# Patient Record
Sex: FEMALE | Race: BLACK OR AFRICAN AMERICAN | Employment: UNEMPLOYED | URBAN - METROPOLITAN AREA
[De-identification: names, ages, dates, MRNs, and addresses within clinical notes are randomized per-mention and may not be internally consistent; named-entity substitution may affect disease eponyms.]

---

## 2017-01-16 ENCOUNTER — APPOINTMENT (OUTPATIENT)
Dept: GENERAL RADIOLOGY | Age: 57
End: 2017-01-16
Attending: PHYSICIAN ASSISTANT
Payer: MEDICAID

## 2017-01-16 ENCOUNTER — HOSPITAL ENCOUNTER (EMERGENCY)
Age: 57
Discharge: HOME | End: 2017-01-16
Attending: EMERGENCY MEDICINE
Payer: MEDICAID

## 2017-01-16 VITALS
TEMPERATURE: 97.9 F | WEIGHT: 130 LBS | OXYGEN SATURATION: 100 % | BODY MASS INDEX: 23.92 KG/M2 | SYSTOLIC BLOOD PRESSURE: 131 MMHG | DIASTOLIC BLOOD PRESSURE: 92 MMHG | HEIGHT: 62 IN | RESPIRATION RATE: 15 BRPM | HEART RATE: 88 BPM

## 2017-01-16 DIAGNOSIS — S83.91XA SPRAIN OF RIGHT KNEE, UNSPECIFIED LIGAMENT, INITIAL ENCOUNTER: Primary | ICD-10-CM

## 2017-01-16 PROCEDURE — 74011250637 HC RX REV CODE- 250/637: Performed by: PHYSICIAN ASSISTANT

## 2017-01-16 PROCEDURE — 73562 X-RAY EXAM OF KNEE 3: CPT

## 2017-01-16 PROCEDURE — 99284 EMERGENCY DEPT VISIT MOD MDM: CPT

## 2017-01-16 RX ORDER — IBUPROFEN 400 MG/1
800 TABLET ORAL
Status: COMPLETED | OUTPATIENT
Start: 2017-01-16 | End: 2017-01-16

## 2017-01-16 RX ORDER — IBUPROFEN 800 MG/1
800 TABLET ORAL
Qty: 20 TAB | Refills: 0 | Status: SHIPPED | OUTPATIENT
Start: 2017-01-16 | End: 2018-03-15 | Stop reason: ALTCHOICE

## 2017-01-16 RX ADMIN — IBUPROFEN 800 MG: 400 TABLET, FILM COATED ORAL at 21:21

## 2017-01-17 NOTE — ED NOTES
Patient reports to ED c/o right knee pain. Patient states \"I twisted it and then fell last night. \" Reports pain behind knee. Patient reports drinking 2 beers today. Patient in NAD.

## 2017-01-17 NOTE — ED NOTES
Discharge Instructions Reviewed with patient per Goyo ENNIS. Discharge instructions given to patient per PA. Sarah able to return verbalize discharge instructions. Paper copy of discharge instructions given. No RX given to patient but instructed on 1 rx sent to patient selected pharmacy electronically per PA. Patient condition stable, Respiratory status WNL, Neurostatus intact.  Ambulatory with crutches out of er, to home with self

## 2017-01-17 NOTE — ED PROVIDER NOTES
Patient is a 61 y.o. female presenting with knee pain. The history is provided by the patient. Knee Pain    This is a new (Pt reports twisting her R knee and falling yesterday when she was startled around 8PM. Denies hx of trauma, arthritis or gout. Denies fever, chills, n/v, numbness tingling. Endorses R knee swelling. ) problem. The current episode started yesterday. The problem occurs constantly. The problem has been gradually worsening. The pain is present in the right knee. The quality of the pain is described as aching. The pain is at a severity of 10/10. Associated symptoms include limited range of motion (Pt states it will not bend all the way. ). Pertinent negatives include no numbness, no stiffness, no tingling, no itching, no back pain and no neck pain. The symptoms are aggravated by contact, movement and palpation. She has tried cold for the symptoms. The treatment provided mild relief. There has been a history of trauma. History reviewed. No pertinent past medical history. History reviewed. No pertinent past surgical history. History reviewed. No pertinent family history. Social History     Social History    Marital status: SINGLE     Spouse name: N/A    Number of children: N/A    Years of education: N/A     Occupational History    Not on file. Social History Main Topics    Smoking status: Current Every Day Smoker     Packs/day: 0.25    Smokeless tobacco: Never Used    Alcohol use 3.0 oz/week     6 Glasses of wine per week    Drug use: No    Sexual activity: Not Currently     Other Topics Concern    Not on file     Social History Narrative         ALLERGIES: Review of patient's allergies indicates no known allergies. Review of Systems   Constitutional: Negative for activity change, appetite change, chills, diaphoresis, fatigue and fever. HENT: Negative. Eyes: Negative. Respiratory: Negative. Negative for cough and shortness of breath.     Cardiovascular: Negative. Negative for chest pain and leg swelling. Gastrointestinal: Negative. Negative for abdominal pain, diarrhea, nausea and vomiting. Genitourinary: Negative. Musculoskeletal: Positive for arthralgias (R knee), gait problem and joint swelling. Negative for back pain, myalgias, neck pain, neck stiffness and stiffness. Skin: Negative. Negative for color change, itching, pallor and wound. Neurological: Negative for tingling and numbness. Psychiatric/Behavioral: Negative. Vitals:    01/16/17 2036   BP: (!) 131/92   Pulse: 88   Resp: 15   Temp: 97.9 °F (36.6 °C)   SpO2: 100%   Weight: 59 kg (130 lb)   Height: 5' 2\" (1.575 m)            Physical Exam   Constitutional: She is oriented to person, place, and time. She appears well-developed and well-nourished. No distress. HENT:   Head: Normocephalic and atraumatic. Right Ear: Hearing and external ear normal.   Left Ear: Hearing and external ear normal.   Nose: Nose normal.   Eyes: Conjunctivae and EOM are normal. Pupils are equal, round, and reactive to light. Neck: Normal range of motion. Pulmonary/Chest: Effort normal. No respiratory distress. Musculoskeletal:        Right hip: Normal.        Right knee: She exhibits decreased range of motion (Decreased flexion at regulo knee by 30deg d/t pain.), swelling and bony tenderness. She exhibits no effusion, no ecchymosis, no deformity, no laceration, no erythema, normal alignment, no LCL laxity, normal patellar mobility, normal meniscus and no MCL laxity. Tenderness found. Medial joint line, lateral joint line, MCL, LCL and patellar tendon tenderness noted. Left knee: Normal.        Right ankle: Normal.   Generalized R knee TTP. Lachman's and Jadon exams limited by pt's pain. Neg valgus/varus stress test.   Neurological: She is alert and oriented to person, place, and time. No cranial nerve deficit. Skin: Skin is warm and dry. She is not diaphoretic.    Psychiatric: She has a normal mood and affect. Her behavior is normal. Judgment and thought content normal.   Nursing note and vitals reviewed. MDM  Number of Diagnoses or Management Options  Sprain of right knee, unspecified ligament, initial encounter:   Diagnosis management comments: DDx: fx, dislocation, strain, sprain, contusion    LABORATORY TESTS:  No results found for this or any previous visit (from the past 12 hour(s)). IMAGING RESULTS:  XR KNEE RT 3 V   Final Result   FINDINGS: Three views of the right knee demonstrate no fracture or other acute  osseous or articular abnormality. There is no effusion. Mild patellofemoral  and medial compartment osteoarthritis. No erosion or chondrocalcinosis. Bone  mineralization is within normal limits.     IMPRESSION  IMPRESSION:   1. No fracture. 2. Mild osteoarthritis. MEDICATIONS GIVEN:  Medications  ibuprofen (MOTRIN) tablet 800 mg (800 mg Oral Given 1/16/17 2121)    IMPRESSION:  Sprain of right knee, unspecified ligament, initial encounter  (primary encounter diagnosis)    PLAN:  1. Current Discharge Medication List    START taking these medications    ibuprofen (MOTRIN) 800 mg tablet  Take 1 Tab by mouth every six (6) hours as needed for Pain. Qty: 20 Tab Refills: 0      CONTINUE these medications which have NOT CHANGED    guaiFENesin-codeine (ROBITUSSIN AC)  mg/5 mL solution  Take 5 mL by mouth three (3) times daily as needed for Cough. Max Daily Amount: 15 mL. Qty: 118 mL Refills: 0    albuterol (PROVENTIL HFA, VENTOLIN HFA, PROAIR HFA) 90 mcg/actuation inhaler   Take 1-2 Puffs by inhalation every four (4) hours as needed for Wheezing. Qty: 1 Inhaler Refills: 1    levofloxacin (LEVAQUIN) 500 mg tablet  Take 1 Tab by mouth daily. Qty: 7 Tab Refills: 0    methylcellulose, laxative, (CITRUCEL SUGAR FREE) Powd  Take 5 mL by mouth two (2) times a day. Qty: 1 Can Refills: 0        2.  Follow-up Information     Follow up With Details Comments [address] Negro Onofre MD In 1 week As needed, If symptoms worsen [address]  [place] [address]  [phone]      [facility] Schedule an appointment as soon as possible for a visit in 1   week As needed, If symptoms worsen Daniela Dawson [facility]    [address] Schedule an appointment as soon as possible for a   visit in 1 week As needed, If symptoms worsen [address] [facility] 1788 [phone]      Return to ED if worse                  Amount and/or Complexity of Data Reviewed  Tests in the radiology section of CPT®: ordered and reviewed  Tests in the medicine section of CPT®: ordered and reviewed    Patient Progress  Patient progress: stable    ED Course       Procedures      9:27 PM  I have discussed with patient their diagnosis, treatment, and follow up plan. The patient agrees to follow up as outlined in discharge paperwork and also to return to the ED with any worsening.  Sigrid Hillman PA-C

## 2017-01-17 NOTE — DISCHARGE INSTRUCTIONS
Knee Sprain: Care Instructions  Your Care Instructions    A knee sprain is one or more stretched, partly torn, or completely torn knee ligaments. Ligaments are bands of ropelike tissue that connect bone to bone and make the knee stable. The knee has four main ligaments. Knee sprains often happen because of a twisting or bending injury from sports such as skiing, basketball, soccer, or football. The knee turns one way while the lower or upper leg goes another way. A sprain also can happen when the knee is hit from the side or the front. If a knee ligament is slightly stretched, you will probably need only home treatment. You may need a splint or brace (immobilizer) for a partly torn ligament. A complete tear may need surgery. A minor knee sprain may take up to 6 weeks to heal, while a severe sprain may take months. Follow-up care is a key part of your treatment and safety. Be sure to make and go to all appointments, and call your doctor if you are having problems. It's also a good idea to know your test results and keep a list of the medicines you take. How can you care for yourself at home? · Follow instructions about how much weight you can put on your leg and how to walk with crutches. · Prop up your leg on a pillow when you ice it or anytime you sit or lie down for the next 3 days. Try to keep it above the level of your heart. This will help reduce swelling. · Put ice or a cold pack on your knee for 10 to 20 minutes at a time. Try to do this every 1 to 2 hours for the next 3 days (when you are awake) or until the swelling goes down. Put a thin cloth between the ice and your skin. Do not get the splint wet. · If you have an elastic bandage, make sure it is snug but not so tight that your leg is numb, tingles, or swells below the bandage. You can loosen the bandage if it is too tight. · Your doctor may recommend a brace (immobilizer) to support your knee while it heals. Wear it as directed.   · Ask your doctor if you can take an over-the-counter pain medicine, such as acetaminophen (Tylenol), ibuprofen (Advil, Motrin), or naproxen (Aleve). Be safe with medicines. Read and follow all instructions on the label. When should you call for help? Call 911 anytime you think you may need emergency care. For example, call if:  · You have sudden chest pain and shortness of breath, or you cough up blood. Call your doctor now or seek immediate medical care if:  · You have increased or severe pain. · You cannot move your toes or ankle. · Your foot is cool or pale or changes color. · You have tingling, weakness, or numbness in your foot or leg. · Your splint or brace feels too tight. · You are unable to straighten the knee, or the knee \"locks. \"  · You have signs of a blood clot in your leg, such as:  ¨ Pain in your calf, back of the knee, thigh, or groin. ¨ Redness and swelling in your leg. Watch closely for changes in your health, and be sure to contact your doctor if:  · Your pain is not getting better or is getting worse. Where can you learn more? Go to http://krishan-jesse.info/. Enter N406 in the search box to learn more about \"Knee Sprain: Care Instructions. \"  Current as of: May 23, 2016  Content Version: 11.1  © 1699-2797 [employer]. Care instructions adapted under license by [employer] (which disclaims liability or warranty for this information). If you have questions about a medical condition or this instruction, always ask your healthcare professional. [place] [address] any warranty or liability for your use of this information.

## 2018-03-15 ENCOUNTER — OFFICE VISIT (OUTPATIENT)
Dept: BEHAVIORAL/MENTAL HEALTH CLINIC | Age: 58
End: 2018-03-15

## 2018-03-15 VITALS
BODY MASS INDEX: 25.21 KG/M2 | HEART RATE: 93 BPM | WEIGHT: 137 LBS | DIASTOLIC BLOOD PRESSURE: 82 MMHG | HEIGHT: 62 IN | SYSTOLIC BLOOD PRESSURE: 139 MMHG

## 2018-03-15 DIAGNOSIS — F12.10 CANNABIS ABUSE, CONTINUOUS USE: ICD-10-CM

## 2018-03-15 DIAGNOSIS — F19.90 SUBSTANCE USE DISORDER: ICD-10-CM

## 2018-03-15 DIAGNOSIS — F10.982 ALCOHOL-INDUCED INSOMNIA (HCC): Primary | ICD-10-CM

## 2018-03-15 RX ORDER — TRAZODONE HYDROCHLORIDE 50 MG/1
50 TABLET ORAL
Qty: 30 TAB | Refills: 0 | Status: SHIPPED | OUTPATIENT
Start: 2018-03-15

## 2018-03-15 NOTE — MR AVS SNAPSHOT
[address] 1400 [address] [place] [address] 
[phone] Patient: Maggie Akbar MRN: [id number] AZF:0/8/1881 Visit Information Date & Time Provider Department Dept. Phone Encounter #  
 3/15/2018  2:00 PM Manjose m Husbands, 81 [facility] [phone] [id number] Upcoming Health Maintenance Date Due Hepatitis C Screening 1960 Pneumococcal 19-64 Medium Risk (1 of 1 - PPSV23) 7/6/1978 DTaP/Tdap/Td series (1 - Tdap) 7/6/1980 PAP AKA CERVICAL CYTOLOGY 7/6/1980 BREAST CANCER SCRN MAMMOGRAM 7/6/2009 FOBT Q 1 YEAR AGE 50-75 7/6/2009 Influenza Age 5 to Adult 8/1/2017 Allergies as of 3/15/2018  Review Complete On: 3/15/2018 By: Tomeka Husbands, NP No Known Allergies Current Immunizations  Never Reviewed No immunizations on file. Not reviewed this visit You Were Diagnosed With   
  
 Codes Comments Alcohol-induced insomnia (HCC)    -  Primary ICD-10-CM: R36.581 ICD-9-CM: 291.82 Substance use disorder     ICD-10-CM: F19.90 ICD-9-CM: 305.90 Cannabis abuse, continuous use     ICD-10-CM: F12.10 ICD-9-CM: 305.21 Vitals BP Pulse Height(growth percentile) Weight(growth percentile) BMI OB Status 139/82 93 5' 2\" (1.575 m) 137 lb (62.1 kg) 25.06 kg/m2 Postmenopausal  
 Smoking Status Current Every Day Smoker BMI and BSA Data Body Mass Index Body Surface Area 25.06 kg/m 2 1.65 m 2 Preferred Pharmacy Pharmacy Name Phone [address], [address] [phone] Your Updated Medication List  
  
   
This list is accurate as of 3/15/18  2:35 PM.  Always use your most recent med list.  
  
  
  
  
 traZODone 50 mg tablet Commonly known as:  Alexi Bowl Take 1 Tab by mouth nightly as needed for Sleep. Prescriptions Sent to Pharmacy Refills traZODone (DESYREL) 50 mg tablet 0 Sig: Take 1 Tab by mouth nightly as needed for Sleep. Class: Normal  
 Pharmacy: [facility] [address], [address] [facility] Toña Vanegas Ph #: [phone] Route: Oral  
  
We Performed the Following 5-DRUG SCREEN, UR [id number] Custom] CBC WITH AUTOMATED DIFF [09270 CPT(R)] HEPATIC FUNCTION PANEL [66581 CPT(R)] METABOLIC PANEL, COMPREHENSIVE [96748 CPT(R)] TSH 3RD GENERATION [76301 CPT(R)] Patient Instructions Sleep Tips What to avoid   
· Do not have drinks with caffeine, such as coffee or black tea, for 8 hours before bed. · Do not smoke or use other types of tobacco near bedtime. Nicotine is a stimulant and can keep you awake. · Avoid drinking alcohol late in the evening, because it can cause you to wake in the middle of the night. · Do not eat a big meal close to bedtime. If you are hungry, eat a light snack. · Do not drink a lot of water close to bedtime, because the need to urinate may wake you up during the night. · Do not read or watch TV in bed. Use the bed only for sleeping and sexual activity. What to try   
· Go to bed at the same time every night, and wake up at the same time every morning. Do not take naps during the day. · Keep your bedroom quiet, dark, and cool. · Get regular exercise, but not within 3 to 4 hours of your bedtime. · Sleep on a comfortable pillow and mattress. · If watching the clock makes you anxious, turn it facing away from you so you cannot see the time. · If you worry when you lie down, start a worry book. Well before bedtime, write down your worries, and then set the book and your concerns aside. · Try meditation or other relaxation techniques before you go to bed. · If you cannot fall asleep, get up and go to another room until you feel sleepy. Do something relaxing. Repeat your bedtime routine before you go to bed again. Make your house quiet and calm about an hour before bedtime. Turn down the lights, turn off the TV, log off the computer, and turn down the volume on music. This can help you relax after a busy day. Substance Abuse Resources Alcoholics Anonymous Hotline (Michelle )                      
 #054-1723 Narcotics Anonymous Hotline () 
 #0-599-701-663-077-3605 [facility] Jacki Eisenberg [address] [facility] #[phone] Http://www. Dympol.[employer]/index.htm [address] [facility]) 
 [phone]      [address] [facility] 
 [phone] [facility], [address] C/Kee Barton [facility] [place] at [facility] Admissions: [phone] [facility] for Recovery [phone] 
 
[email or web address] [address] [phone] The daily planet- [place] [address] street- [phone] Local CSB's 
 [address] [facility],[address] [facility] end - [address] [facility] end- [facility] Methadone out patient clinic [address] [facility] Mon, wed, Thursday- 8am - 12 noon. Can walk in any day and speak with counsellor, bring picture  [facility] Arvind [place] [address]- Orthodoxy dora based recovery model · Introducing [facility]! [facility] introduces [employer] patient portal. Now you can access parts of your medical record, email your doctor's office, and request medication refills online. 1. In your internet browser, go to [email or web address] 2. Click on the First Time User? Click Here link in the Sign In box. You will see the New Member Sign Up page. 3. Enter your [employer] Access Code exactly as it appears below. You will not need to use this code after youve completed the sign-up process. If you do not sign up before the expiration date, you must request a new code.  
 
· [employer] Access Code: [email or web address] 
 Expires: 6/13/2018  2:23 PM 
 
4. Enter the last four digits of your Social Security Number (xxxx) and Date of Birth (mm/dd/yyyy) as indicated and click Submit. You will be taken to the next sign-up page. 5. Create a [employer] ID. This will be your [employer] login ID and cannot be changed, so think of one that is secure and easy to remember. 6. Create a [employer] password. You can change your password at any time. 7. Enter your Password Reset Question and Answer. This can be used at a later time if you forget your password. 8. Enter your e-mail address. You will receive e-mail notification when new information is available in [address] 9. Click Sign Up. You can now view and download portions of your medical record. 10. Click the Download Summary menu link to download a portable copy of your medical information. If you have questions, please visit the Frequently Asked Questions section of the [employer] website. Remember, [employer] is NOT to be used for urgent needs. For medical emergencies, dial 911. Now available from your iPhone and Android! Please provide this summary of care documentation to your next provider. Your primary care clinician is listed as Karla Molina Md. If you have any questions after today's visit, please call [phone].

## 2018-03-15 NOTE — PATIENT INSTRUCTIONS
Sleep Tips    What to avoid    · Do not have drinks with caffeine, such as coffee or black tea, for 8 hours before bed. · Do not smoke or use other types of tobacco near bedtime. Nicotine is a stimulant and can keep you awake. · Avoid drinking alcohol late in the evening, because it can cause you to wake in the middle of the night. · Do not eat a big meal close to bedtime. If you are hungry, eat a light snack. · Do not drink a lot of water close to bedtime, because the need to urinate may wake you up during the night. · Do not read or watch TV in bed. Use the bed only for sleeping and sexual activity. What to try    · Go to bed at the same time every night, and wake up at the same time every morning. Do not take naps during the day. · Keep your bedroom quiet, dark, and cool. · Get regular exercise, but not within 3 to 4 hours of your bedtime. · Sleep on a comfortable pillow and mattress. · If watching the clock makes you anxious, turn it facing away from you so you cannot see the time. · If you worry when you lie down, start a worry book. Well before bedtime, write down your worries, and then set the book and your concerns aside. · Try meditation or other relaxation techniques before you go to bed. · If you cannot fall asleep, get up and go to another room until you feel sleepy. Do something relaxing. Repeat your bedtime routine before you go to bed again. Make your house quiet and calm about an hour before bedtime. Turn down the lights, turn off the TV, log off the computer, and turn down the volume on music. This can help you relax after a busy day. Substance Abuse Resources    Alcoholics Anonymous Hotline (II)                        #549-9987    Narcotics Anonymous Hotline (NA)   #8-997-612-651-897-0447    [facility]   [address]   #[phone]   Http://www. Maxta/index.htm    5 [facility])   [phone]      [address]    [address] [facility] Program   [phone]       [address] RODOLFO/Kee Franks Final    The [place] at [facility] [facility]   Admissions: [phone]    [facility] for [facility]   [phone]    [email or web address]    [facility] Reza- [address] [facility] [address] [facility] [phone]    The daily planet- [place] [facility] [address] [place] [address] end - [id number] [place] [address] end- [address]     [employer] resource [facility]. Methadone out patient clinic  [address] [facility] Mon, wed, Thursday- 8am - 12 noon.  Can walk in any day and speak with counsellor, bring picture ID    [facility]   Leticia Hankins [address] [place] [address]- Zoroastrianism dora based recovery model    ·

## 2018-03-15 NOTE — PROGRESS NOTES
Ambulatory Initial Psychiatric Evaluation     Chief Complaint: \" I have anxietya nd depression\" I am an addict\"    History of Present Illness: Agustin Ceron is a 61 y.o. female who presents with symptoms of anxiety and substance use anxiety disorder, alcohol use disorder, cannabis use continuous, h/o cocaine use. Patient reports/evidences the following emotional symptoms:  sleeping for 5-6   hrs and reported difficulty initiating and maintaining sleep. reported works hard and not able to sleep. Reported smokes cannabis for sleep weekly. reported has irritability, appetite is good,  has interest and energy, labile mood, able to focus and concentrate. Working night shifts. Reported taking vitamins daily. Reported her sleep is th main concern and will be much better if sleeping well. Denied ay symptoms of dori or psychosis. reported AH when under influence of cocaine only. Additional symptomatology include anxiety. The above symptoms have been present for a many years. The patient reports onset of symptoms for 10 years. These symptoms are of high severity as per patient's report. The symptoms are variable in nature. The patient's condition has been precipitated by and condition worsened with substance use. Stressors/life events: cocaine use, alcohol and THC use more than 20 years. Raped at age [id number] [address]. Pt denied any flashbacks, hypervigilance or avoidance or reexperience or night ambrocio. Pt denied any h/o seizures or head trauma or neurological problems. Client denied any SI or any plan or intent; denied HI or SIB. There is no evidence of hallucinations, psychosis or dori.      Past Psychiatric History:     Previous psychiatric care: admits    Age 27 to 39- half-way - for posession of cocaine-was on doxepine  Age 39- now- not on any meds- used alcohol, tylenol pm , relapsed on cocaine        Previous suicide attempts: denied    Previous self injurious behavior: No    Previous Psych Hospitalizations: denies    Current psychotropics: none          Previous psychiatric medications/ECT/TMS: denies            Past history of SA,rehab, detox, withdrawal: x 3- [facility]    Social History:   Social History     Social History    Marital status: LEGALLY      Spouse name: N/A    Number of children: N/A    Years of education: N/A     Social History Main Topics    Smoking status: Current Every Day Smoker     Packs/day: 0.25    Smokeless tobacco: Never Used    Alcohol use 3.6 oz/week     6 Cans of beer per week      Comment: 5-6 per day    Drug use: No    Sexual activity: Not Currently     Other Topics Concern    None     Social History Narrative        Ethnic:   Relationship Status:   Kids: 3- ages 13, 29, 21  Living Situation: Alone   Born:   Raised by:   Siblings:   Education:   Employment: part time sitter and house keeping  Tobacco:  tobacco use: smoked 3 cigarettes per day(s) for 20 years  Caffeine: no caffeine use  Alcohol: alcohol intake:> 5 beers per day(s), using alcohol since age 23  Illicit Drug Social History:  daily smoking cocaine, snorted in past, stopped cocaine 2 years ago; cannabis use weekly  Hobbies:  music   Abuse: denied  Sexual:  heterosexual  Support: family  Legal: longterm- 2 years - possession of cocaine and prostitution, trespassing charges, denied any pending charges    Family History:   Family History   Problem Relation Age of Onset    Hypertension Mother     Diabetes Mother     Diabetes Father         Family Psychiatric history: Theres no formal diagnosed psychiatric illness in the family. There is no history of suicide attempt in the family. Past Medical/Surgical History:   Past Medical History:   Diagnosis Date    Depression          Allergies:   No Known Allergies     Medication List:        A comprehensive review of systems was negative except for that written in the HPI.     Psychiatric/Mental Status Examination:     MENTAL STATUS EXAM:  Sensorium  oriented to time, place and person   Orientation person, place, time/date, situation, day of week, month of year and year   Relations cooperative   Eye Contact appropriate   Appearance:  casually dressed , older than stated age, and within normal Limits   Motor Behavior:  gait stable and within normal limits   Speech:  normal pitch and normal volume   Vocabulary average   Thought Process: goal directed and within normal limits   Thought Content free of delusions and free of hallucinations   Suicidal ideations no plan , no intention and none   Homicidal ideations no plan , no intention and none   Mood:  anxious and depressed   Affect:  anxious, depressed and mood-congruent   Memory recent  adequate   Memory remote:  adequate   Concentration:  adequate   Abstraction:  abstract   Insight:  fair   Reliability fair   Judgment:  fair     Labs:  Results for orders placed or performed during the hospital encounter of 09/25/15   EKG, 12 LEAD, INITIAL   Result Value Ref Range    Ventricular Rate 113 BPM    Atrial Rate 113 BPM    P-R Interval 162 ms    QRS Duration 50 ms    Q-T Interval 338 ms    QTC Calculation (Bezet) 463 ms    Calculated P Axis 64 degrees    Calculated R Axis 46 degrees    Calculated T Axis 65 degrees    Diagnosis       ** Poor data quality, interpretation may be adversely affected  Sinus tachycardia  Possible Left atrial enlargement  RSR' or QR pattern in V1 suggests right ventricular conduction delay  No previous ECGs available  Confirmed by Fallon Murray ([id number]) on 9/27/2015 2:02:15 PM           Assessment:  The client, Claire Marino is a 61 y.o. AA female presents with substance induced anxiety and mood disorder. She is currently using cannabis and alcohol. H/o cocaine use and reported sober since 2 years. She stated  \"I am addicted to cocaine, alcohol and cannabis\". Reported has insomnia, and anxiety denied any symptoms of depression or dori or psychosis.  Advised to begin substance use treatment program. Client stated that she is satisfied with the care received. Discussed importance of SUTP in her treatment plan and gave resources. Reviewed labs and records. Patient denies SI/HI/SIB. No evidence of AH/VH or delusions. Possible organic causes contributing are: none  Reviewed medical admissions and discussed with the patient. Client is medically stable. Vitals stable    PHQ 9 score: 11  HAM-2  Diagnosis: Substance use disorder, Alcohol induced insomnia, alcohol use continuous, cannabis use continuous, h/o cocaine use , r/o Substance use mood disrder    Treatment Plan:   1. Medication: begin trazodone 50 mg HS prn                         Ordered labs- CBC, LFT, BMP, UDS                        Gave substance use resources    2. Discussed:  the risks and benefits of the proposed medication  the potential medication side effects  dry mouth, GI disturbance, headache, insomnia, libido decreased, weight gain, somnolence  patient given opportunity to ask questions  3. Psychotherapy: Recommended: CBT- gave a list of local providers. 4. Medical: PCP  5. Return to Clinic: Follow-up Disposition: Not on File or sooner prn    The risk versus benefits of treatment were discussed and side effects explained. Patient agreed with plan. Patient instructed to call with any side effects.   - Instructed patient to call the clinic, and if after hours call the provider on call if experiences any suicidal thought or ideas to hurt herself or other. Also instructed to call 911 or go to the ED. Patient verbalized understanding and agreed to call. Patient was given an after visit summary or informed of [employer] [facility] which includes patient instructions, diagnoses, current medications, & vitals.       Time spent with Patient:  30 to 74 minutes    Prisca Pena NP  3/15/2018

## 2018-03-16 LAB
ALBUMIN SERPL-MCNC: 4.3 G/DL (ref 3.5–5.5)
ALBUMIN/GLOB SERPL: 1.4 {RATIO} (ref 1.2–2.2)
ALP SERPL-CCNC: 46 IU/L (ref 39–117)
ALT SERPL-CCNC: 17 IU/L (ref 0–32)
AMPHETAMINES UR QL SCN: NEGATIVE NG/ML
AST SERPL-CCNC: 23 IU/L (ref 0–40)
BASOPHILS # BLD AUTO: 0 X10E3/UL (ref 0–0.2)
BASOPHILS NFR BLD AUTO: 0 %
BILIRUB DIRECT SERPL-MCNC: 0.05 MG/DL (ref 0–0.4)
BILIRUB SERPL-MCNC: <0.2 MG/DL (ref 0–1.2)
BUN SERPL-MCNC: 9 MG/DL (ref 6–24)
BUN/CREAT SERPL: 18 (ref 9–23)
BZE UR QL: POSITIVE NG/ML
CALCIUM SERPL-MCNC: 9.3 MG/DL (ref 8.7–10.2)
CANNABINOIDS UR QL SCN: POSITIVE NG/ML
CHLORIDE SERPL-SCNC: 101 MMOL/L (ref 96–106)
CO2 SERPL-SCNC: 26 MMOL/L (ref 18–29)
CREAT SERPL-MCNC: 0.49 MG/DL (ref 0.57–1)
DRUG SCREEN COMMENT:, 753798: NORMAL
EOSINOPHIL # BLD AUTO: 0.3 X10E3/UL (ref 0–0.4)
EOSINOPHIL NFR BLD AUTO: 6 %
ERYTHROCYTE [DISTWIDTH] IN BLOOD BY AUTOMATED COUNT: 14.7 % (ref 12.3–15.4)
GFR SERPLBLD CREATININE-BSD FMLA CKD-EPI: 108 ML/MIN/1.73
GFR SERPLBLD CREATININE-BSD FMLA CKD-EPI: 124 ML/MIN/1.73
GLOBULIN SER CALC-MCNC: 3.1 G/DL (ref 1.5–4.5)
GLUCOSE SERPL-MCNC: 91 MG/DL (ref 65–99)
HCT VFR BLD AUTO: 38.6 % (ref 34–46.6)
HGB BLD-MCNC: 12.5 G/DL (ref 11.1–15.9)
IMM GRANULOCYTES # BLD: 0 X10E3/UL (ref 0–0.1)
IMM GRANULOCYTES NFR BLD: 0 %
LYMPHOCYTES # BLD AUTO: 1.7 X10E3/UL (ref 0.7–3.1)
LYMPHOCYTES NFR BLD AUTO: 37 %
MCH RBC QN AUTO: 27.5 PG (ref 26.6–33)
MCHC RBC AUTO-ENTMCNC: 32.4 G/DL (ref 31.5–35.7)
MCV RBC AUTO: 85 FL (ref 79–97)
MONOCYTES # BLD AUTO: 0.3 X10E3/UL (ref 0.1–0.9)
MONOCYTES NFR BLD AUTO: 7 %
NEUTROPHILS # BLD AUTO: 2.3 X10E3/UL (ref 1.4–7)
NEUTROPHILS NFR BLD AUTO: 50 %
OPIATES UR QL SCN: POSITIVE NG/ML
PCP UR QL: NEGATIVE NG/ML
PLATELET # BLD AUTO: 277 X10E3/UL (ref 150–379)
POTASSIUM SERPL-SCNC: 4.4 MMOL/L (ref 3.5–5.2)
PROT SERPL-MCNC: 7.4 G/DL (ref 6–8.5)
RBC # BLD AUTO: 4.54 X10E6/UL (ref 3.77–5.28)
SODIUM SERPL-SCNC: 143 MMOL/L (ref 134–144)
TSH SERPL DL<=0.005 MIU/L-ACNC: 0.24 UIU/ML (ref 0.45–4.5)
WBC # BLD AUTO: 4.5 X10E3/UL (ref 3.4–10.8)

## 2018-05-31 ENCOUNTER — HOSPITAL ENCOUNTER (EMERGENCY)
Age: 58
Discharge: HOME | End: 2018-06-01
Attending: EMERGENCY MEDICINE
Payer: SELF-PAY

## 2018-05-31 DIAGNOSIS — J36 TONSILLAR ABSCESS: Primary | ICD-10-CM

## 2018-05-31 PROCEDURE — 99284 EMERGENCY DEPT VISIT MOD MDM: CPT

## 2018-06-01 ENCOUNTER — APPOINTMENT (OUTPATIENT)
Dept: CT IMAGING | Age: 58
End: 2018-06-01
Attending: EMERGENCY MEDICINE
Payer: SELF-PAY

## 2018-06-01 VITALS
BODY MASS INDEX: 25.76 KG/M2 | OXYGEN SATURATION: 98 % | HEART RATE: 66 BPM | HEIGHT: 62 IN | DIASTOLIC BLOOD PRESSURE: 71 MMHG | RESPIRATION RATE: 18 BRPM | SYSTOLIC BLOOD PRESSURE: 118 MMHG | TEMPERATURE: 98.6 F | WEIGHT: 140 LBS

## 2018-06-01 LAB
ANION GAP BLD CALC-SCNC: 12 MMOL/L (ref 10–20)
BASOPHILS # BLD: 0 K/UL (ref 0–0.1)
BASOPHILS NFR BLD: 0 % (ref 0–1)
BUN BLD-MCNC: 15 MG/DL (ref 9–20)
CA-I BLD-MCNC: 1.18 MMOL/L (ref 1.12–1.32)
CHLORIDE BLD-SCNC: 107 MMOL/L (ref 98–107)
CO2 BLD-SCNC: 28 MMOL/L (ref 21–32)
CREAT BLD-MCNC: 0.6 MG/DL (ref 0.6–1.3)
DIFFERENTIAL METHOD BLD: ABNORMAL
EOSINOPHIL # BLD: 0.2 K/UL (ref 0–0.4)
EOSINOPHIL NFR BLD: 1 % (ref 0–7)
ERYTHROCYTE [DISTWIDTH] IN BLOOD BY AUTOMATED COUNT: 14.8 % (ref 11.5–14.5)
GLUCOSE BLD-MCNC: 107 MG/DL (ref 65–100)
HCT VFR BLD AUTO: 33.5 % (ref 35–47)
HCT VFR BLD CALC: 36 % (ref 35–47)
HGB BLD-MCNC: 11.6 G/DL (ref 11.5–16)
IMM GRANULOCYTES # BLD: 0.1 K/UL (ref 0–0.04)
IMM GRANULOCYTES NFR BLD AUTO: 1 % (ref 0–0.5)
LYMPHOCYTES # BLD: 1.6 K/UL (ref 0.8–3.5)
LYMPHOCYTES NFR BLD: 14 % (ref 12–49)
MCH RBC QN AUTO: 27.4 PG (ref 26–34)
MCHC RBC AUTO-ENTMCNC: 34.6 G/DL (ref 30–36.5)
MCV RBC AUTO: 79.2 FL (ref 80–99)
MONOCYTES # BLD: 1.2 K/UL (ref 0–1)
MONOCYTES NFR BLD: 10 % (ref 5–13)
NEUTS SEG # BLD: 8.9 K/UL (ref 1.8–8)
NEUTS SEG NFR BLD: 74 % (ref 32–75)
NRBC # BLD: 0 K/UL (ref 0–0.01)
NRBC BLD-RTO: 0 PER 100 WBC
PLATELET # BLD AUTO: 291 K/UL (ref 150–400)
PMV BLD AUTO: 10.6 FL (ref 8.9–12.9)
POTASSIUM BLD-SCNC: 3.6 MMOL/L (ref 3.5–5.1)
RBC # BLD AUTO: 4.23 M/UL (ref 3.8–5.2)
SERVICE CMNT-IMP: ABNORMAL
SODIUM BLD-SCNC: 143 MMOL/L (ref 136–145)
WBC # BLD AUTO: 11.9 K/UL (ref 3.6–11)

## 2018-06-01 PROCEDURE — 70491 CT SOFT TISSUE NECK W/DYE: CPT

## 2018-06-01 PROCEDURE — 80047 BASIC METABLC PNL IONIZED CA: CPT

## 2018-06-01 PROCEDURE — 74011636320 HC RX REV CODE- 636/320: Performed by: EMERGENCY MEDICINE

## 2018-06-01 PROCEDURE — 96365 THER/PROPH/DIAG IV INF INIT: CPT

## 2018-06-01 PROCEDURE — 96375 TX/PRO/DX INJ NEW DRUG ADDON: CPT

## 2018-06-01 PROCEDURE — 85025 COMPLETE CBC W/AUTO DIFF WBC: CPT | Performed by: EMERGENCY MEDICINE

## 2018-06-01 PROCEDURE — 74011250636 HC RX REV CODE- 250/636

## 2018-06-01 PROCEDURE — 74011250636 HC RX REV CODE- 250/636: Performed by: EMERGENCY MEDICINE

## 2018-06-01 PROCEDURE — 74011000258 HC RX REV CODE- 258: Performed by: EMERGENCY MEDICINE

## 2018-06-01 RX ORDER — MORPHINE SULFATE 4 MG/ML
INJECTION, SOLUTION INTRAMUSCULAR; INTRAVENOUS
Status: DISCONTINUED
Start: 2018-06-01 | End: 2018-06-01 | Stop reason: HOSPADM

## 2018-06-01 RX ORDER — MORPHINE SULFATE 4 MG/ML
2 INJECTION, SOLUTION INTRAMUSCULAR; INTRAVENOUS
Status: COMPLETED | OUTPATIENT
Start: 2018-06-01 | End: 2018-06-01

## 2018-06-01 RX ORDER — HYDROCODONE BITARTRATE AND ACETAMINOPHEN 5; 325 MG/1; MG/1
1 TABLET ORAL
Qty: 8 TAB | Refills: 0 | Status: SHIPPED | OUTPATIENT
Start: 2018-06-01

## 2018-06-01 RX ORDER — AMOXICILLIN AND CLAVULANATE POTASSIUM 500; 125 MG/1; MG/1
1 TABLET, FILM COATED ORAL 2 TIMES DAILY
Qty: 20 TAB | Refills: 0 | Status: SHIPPED | OUTPATIENT
Start: 2018-06-01

## 2018-06-01 RX ADMIN — IOPAMIDOL 100 ML: 612 INJECTION, SOLUTION INTRAVENOUS at 01:32

## 2018-06-01 RX ADMIN — MORPHINE SULFATE 2 MG: 4 INJECTION, SOLUTION INTRAMUSCULAR; INTRAVENOUS at 03:12

## 2018-06-01 RX ADMIN — SODIUM CHLORIDE 3 G: 900 INJECTION, SOLUTION INTRAVENOUS at 03:30

## 2018-06-01 NOTE — ED NOTES
Pt C/O left side mouth and facial pain. Swelling noted left face and neck. Tenderness to touch. Pt denies ear pain. Pt sts took APAP two hours ago without relief. Emergency Department Nursing Plan of Care       The Nursing Plan of Care is developed from the Nursing assessment and Emergency Department Attending provider initial evaluation. The plan of care may be reviewed in the ED Provider note.     The Plan of Care was developed with the following considerations:   Patient / Family readiness to learn indicated by:verbalized understanding  Persons(s) to be included in education: patient  Barriers to Learning/Limitations:No    [address], RN    5/31/2018   11:02 PM

## 2018-06-01 NOTE — ED NOTES
Pt left room and was leaving ED. Pt was asked it she was leaving. Pt looked at this RN and did not answer. Pt left ED and exited the building. Pt left prior to being seen by physician.

## 2018-06-01 NOTE — ED NOTES
Patient given copy of dc instructions and two script(s). Patient verbalized understanding of instructions and script(s). Patient given a current medication reconciliation form and verbalized understanding of their medications. Patient verbalized understanding of the importance of discussing medications with  his or her physician or clinic when they follow up. Patient alert and oriented and in no acute distress. Pt verbalizes pain scale of 4 out of 10. Patient discharged home without assistance. Wheelchair was declined.

## 2018-06-01 NOTE — ED PROVIDER NOTES
EMERGENCY DEPARTMENT HISTORY AND PHYSICAL EXAM      Date: 5/31/2018  Patient Name: Keke Rhoades    History of Presenting Illness     Chief Complaint   Patient presents with    Sore Throat     since yesterday,denies fever       History Provided By: Patient    HPI: Keke Rhoades, 61 y.o. female with PMHx significant for depression, presents via EMS to the ED with cc of L throat pain onset last night. Pt denies throat hurting before last night. Pt denies fever. Chief Complaint: L throat pain  Duration: 1 Days  Timing:  Constant  Location: L throat  Modifying Factors: None  Associated Symptoms: denies any other associated signs or symptoms    There are no other complaints, changes, or physical findings at this time. Social Hx: +Tobacco, +EtOH, -Illicit Drug Use    PCP: Edie Neal MD    Current Outpatient Prescriptions   Medication Sig Dispense Refill    traZODone (DESYREL) 50 mg tablet Take 1 Tab by mouth nightly as needed for Sleep. 30 Tab 0       Past History     Past Medical History:  Past Medical History:   Diagnosis Date    Depression        Past Surgical History:  History reviewed. No pertinent surgical history. Family History:  Family History   Problem Relation Age of Onset    Hypertension Mother     Diabetes Mother     Diabetes Father        Social History:  Social History   Substance Use Topics    Smoking status: Current Every Day Smoker     Packs/day: 0.25    Smokeless tobacco: Never Used    Alcohol use 3.6 oz/week     6 Cans of beer per week      Comment: 5-6 per day       Allergies:  No Known Allergies      Review of Systems   Review of Systems   Constitutional: Negative for fever. HENT: Positive for sore throat (L throat). Eyes: Negative for photophobia and redness. Respiratory: Negative for shortness of breath and wheezing. Cardiovascular: Negative for chest pain and leg swelling. Gastrointestinal: Negative for abdominal pain, blood in stool, nausea and vomiting. Genitourinary: Negative for difficulty urinating, dysuria, hematuria, menstrual problem and vaginal bleeding. Musculoskeletal: Negative for back pain and joint swelling. Neurological: Negative for dizziness, seizures, syncope, speech difficulty, weakness, numbness and headaches. Hematological: Negative for adenopathy. Psychiatric/Behavioral: Negative for agitation, confusion and suicidal ideas. The patient is not nervous/anxious. Physical Exam   Physical Exam   Constitutional: She is oriented to person, place, and time. She appears well-developed and well-nourished. HENT:   Head: Normocephalic and atraumatic. Asymmetrical swelling of posterior pharynx. L tonsil pushed to middle. Eyes: Conjunctivae are normal. Pupils are equal, round, and reactive to light. Right eye exhibits no discharge. Left eye exhibits no discharge. Neck: Normal range of motion. Neck supple. No tracheal deviation present. Cardiovascular: Normal rate, regular rhythm and normal heart sounds. No murmur heard. Pulmonary/Chest: Effort normal and breath sounds normal. No respiratory distress. She has no wheezes. She has no rales. Abdominal: Soft. Bowel sounds are normal. There is no tenderness. There is no rebound and no guarding. Musculoskeletal: Normal range of motion. She exhibits no edema, tenderness or deformity. Neurological: She is alert and oriented to person, place, and time. Skin: Skin is warm and dry. No rash noted. No erythema. Psychiatric: Her behavior is normal.   Nursing note and vitals reviewed. Diagnostic Study Results     Labs -   No results found for this or any previous visit (from the past 12 hour(s)). Radiologic Studies -   CTA NECK    (Results Pending)     Medical Decision Making   I am the first provider for this patient. I reviewed the vital signs, available nursing notes, past medical history, past surgical history, family history and social history.     Vital Signs-Reviewed the patient's vital signs. Patient Vitals for the past 12 hrs:   Temp Pulse Resp BP SpO2   05/31/18 2217 98.6 °F (37 °C) 73 18 119/74 99 %       Pulse Oximetry Analysis - 99% on RA    Records Reviewed: Nursing Notes, Old Medical Records and Previous Radiology Studies    Provider Notes (Medical Decision Making):   DDx: Peritonsillar abscess    ED Course:   Initial assessment performed. The patients presenting problems have been discussed, and they are in agreement with the care plan formulated and outlined with them. I have encouraged them to ask questions as they arise throughout their visit. Tobacco Counseling  Discussed the risks of smoking and the benefits of smoking cessation as well as the long term sequelae of smoking with the patient. The patient verbalized their understanding. Critical Care Time:   0 minutes. Disposition:        PLAN:  1. Current Discharge Medication List        2. Follow-up Information     None        Return to ED if worse     Diagnosis     Clinical Impression: No diagnosis found. Attestations: This note is prepared by Ailyn Faust, acting as scribe for MD Farzaneh Partida MD: The scribe's documentation has been prepared under my direction and personally reviewed by me in its entirety. I confirm that the note above accurately reflects all work, treatment, procedures, and medical decision making performed by me.

## 2018-06-01 NOTE — ED NOTES
Pt asked about when the doctor would see her. This RN explained that the physician would be with her as soon as possible and apologized for the delay.

## 2018-06-01 NOTE — PROGRESS NOTES
Pharmacy called. Patient unable to afford augmentin DX peritonsillar abscess. I directed clindamycin 300mg tid for 10 days.  Barbra Alford, NP